# Patient Record
Sex: FEMALE | Race: WHITE | URBAN - METROPOLITAN AREA
[De-identification: names, ages, dates, MRNs, and addresses within clinical notes are randomized per-mention and may not be internally consistent; named-entity substitution may affect disease eponyms.]

---

## 2020-01-16 ENCOUNTER — APPOINTMENT (RX ONLY)
Dept: URBAN - METROPOLITAN AREA CLINIC 51 | Facility: CLINIC | Age: 65
Setting detail: DERMATOLOGY
End: 2020-01-16

## 2020-01-16 DIAGNOSIS — L81.0 POSTINFLAMMATORY HYPERPIGMENTATION: ICD-10-CM

## 2020-01-16 DIAGNOSIS — L28.1 PRURIGO NODULARIS: ICD-10-CM

## 2020-01-16 PROBLEM — K21.9 GASTRO-ESOPHAGEAL REFLUX DISEASE WITHOUT ESOPHAGITIS: Status: ACTIVE | Noted: 2020-01-16

## 2020-01-16 PROBLEM — I10 ESSENTIAL (PRIMARY) HYPERTENSION: Status: ACTIVE | Noted: 2020-01-16

## 2020-01-16 PROBLEM — E78.5 HYPERLIPIDEMIA, UNSPECIFIED: Status: ACTIVE | Noted: 2020-01-16

## 2020-01-16 PROCEDURE — ? INTRALESIONAL KENALOG

## 2020-01-16 PROCEDURE — ? COUNSELING

## 2020-01-16 PROCEDURE — 99202 OFFICE O/P NEW SF 15 MIN: CPT | Mod: 25

## 2020-01-16 PROCEDURE — 11900 INJECT SKIN LESIONS </W 7: CPT

## 2020-01-16 PROCEDURE — ? PRESCRIPTION

## 2020-01-16 RX ORDER — ALCLOMETASONE DIPROPIONATE 0.5 MG/G
CREAM TOPICAL BID
Qty: 1 | Refills: 1 | Status: ERX | COMMUNITY
Start: 2020-01-16

## 2020-01-16 RX ADMIN — ALCLOMETASONE DIPROPIONATE: 0.5 CREAM TOPICAL at 00:00

## 2020-01-16 ASSESSMENT — LOCATION SIMPLE DESCRIPTION DERM
LOCATION SIMPLE: CHIN
LOCATION SIMPLE: LEFT CHEEK

## 2020-01-16 ASSESSMENT — LOCATION DETAILED DESCRIPTION DERM
LOCATION DETAILED: RIGHT CHIN
LOCATION DETAILED: LEFT INFERIOR CENTRAL MALAR CHEEK
LOCATION DETAILED: LEFT SUPERIOR CENTRAL MALAR CHEEK

## 2020-01-16 ASSESSMENT — SEVERITY ASSESSMENT: SEVERITY: MILD

## 2020-01-16 ASSESSMENT — LOCATION ZONE DERM: LOCATION ZONE: FACE

## 2020-01-16 NOTE — PROCEDURE: INTRALESIONAL KENALOG
Lot # (Optional): [id number]
Expiration Date (Optional): 03/2021
Medical Necessity Clause: This procedure was medically necessary because the lesions that were treated were:
Kenalog Preparation: Kenalog with normal saline
Treatment Number (Optional): 1
X Size Of Lesion In Cm (Optional): 0
Detail Level: Detailed
Concentration Of Solution Injected (Mg/Ml): 2.5
Administered By (Optional): Dr. Beverly
Include Z78.9 (Other Specified Conditions Influencing Health Status) As An Associated Diagnosis?: No
Ndc# For Kenalog Only: [id number]
Consent: The risks of atrophy were reviewed with the patient. Verbal consent